# Patient Record
Sex: FEMALE | Race: WHITE | URBAN - NONMETROPOLITAN AREA
[De-identification: names, ages, dates, MRNs, and addresses within clinical notes are randomized per-mention and may not be internally consistent; named-entity substitution may affect disease eponyms.]

---

## 2019-05-13 ENCOUNTER — APPOINTMENT (OUTPATIENT)
Dept: URBAN - NONMETROPOLITAN AREA CLINIC 39 | Age: 24
Setting detail: DERMATOLOGY
End: 2019-05-13

## 2019-05-13 DIAGNOSIS — L73.2 HIDRADENITIS SUPPURATIVA: ICD-10-CM

## 2019-05-13 DIAGNOSIS — L81.0 POSTINFLAMMATORY HYPERPIGMENTATION: ICD-10-CM

## 2019-05-13 PROBLEM — G40.909 EPILEPSY, UNSPECIFIED, NOT INTRACTABLE, WITHOUT STATUS EPILEPTICUS: Status: ACTIVE | Noted: 2019-05-13

## 2019-05-13 PROBLEM — L70.0 ACNE VULGARIS: Status: ACTIVE | Noted: 2019-05-13

## 2019-05-13 PROBLEM — F41.9 ANXIETY DISORDER, UNSPECIFIED: Status: ACTIVE | Noted: 2019-05-13

## 2019-05-13 PROBLEM — F32.9 MAJOR DEPRESSIVE DISORDER, SINGLE EPISODE, UNSPECIFIED: Status: ACTIVE | Noted: 2019-05-13

## 2019-05-13 PROCEDURE — 99202 OFFICE O/P NEW SF 15 MIN: CPT | Mod: 25

## 2019-05-13 PROCEDURE — OTHER REASSURANCE: OTHER

## 2019-05-13 PROCEDURE — OTHER ADDITIONAL NOTES: OTHER

## 2019-05-13 PROCEDURE — 10060 I&D ABSCESS SIMPLE/SINGLE: CPT

## 2019-05-13 PROCEDURE — OTHER COUNSELING: OTHER

## 2019-05-13 PROCEDURE — OTHER MIPS QUALITY: OTHER

## 2019-05-13 PROCEDURE — OTHER INCISION AND DRAINAGE: OTHER

## 2019-05-13 PROCEDURE — OTHER ORDER TESTS: OTHER

## 2019-05-13 ASSESSMENT — LOCATION DETAILED DESCRIPTION DERM
LOCATION DETAILED: LEFT INFERIOR CENTRAL MALAR CHEEK
LOCATION DETAILED: LEFT ANTERIOR PROXIMAL THIGH
LOCATION DETAILED: RIGHT ANTERIOR PROXIMAL THIGH
LOCATION DETAILED: RIGHT INFERIOR CENTRAL MALAR CHEEK

## 2019-05-13 ASSESSMENT — LOCATION ZONE DERM
LOCATION ZONE: FACE
LOCATION ZONE: LEG

## 2019-05-13 ASSESSMENT — LOCATION SIMPLE DESCRIPTION DERM
LOCATION SIMPLE: LEFT THIGH
LOCATION SIMPLE: RIGHT THIGH
LOCATION SIMPLE: RIGHT CHEEK
LOCATION SIMPLE: LEFT CHEEK

## 2019-05-13 NOTE — PROCEDURE: INCISION AND DRAINAGE
Epidermal Sutures: 4-0 Ethilon
Dressing: Band-Aid
Consent was obtained and risks were reviewed including but not limited to delayed wound healing, infection, need for multiple I and D's, and pain.
Include Sutures?: No
Suture Text: The incision was partially closed with
Preparation Text: The area was prepped in the usual clean fashion.
Body Location Override (Optional - Billing Will Still Be Based On Selected Body Map Location If Applicable): left groin
Size Of Lesion In Cm (Optional But May Be Required For Some Insurances): 1.5
Method: 11 blade
Post-Care Instructions: I reviewed with the patient in detail post-care instructions. Patient should keep wound covered and call the office should any redness, pain, swelling or worsening occur.
Epidermal Closure: simple interrupted
Lesion Type: Cyst
Wound Care: Vaseline
Detail Level: Zone

## 2019-05-13 NOTE — PROCEDURE: ADDITIONAL NOTES
Additional Notes: Will wait for c&S results to call in an ATB
Detail Level: Zone
Additional Notes: Use a face wash with BP

## 2019-08-05 ENCOUNTER — RX ONLY (RX ONLY)
Age: 24
End: 2019-08-05

## 2019-08-05 RX ORDER — ADAPALENE 3 MG/G
GEL TOPICAL
Qty: 1 | Refills: 2 | Status: ERX | COMMUNITY
Start: 2019-08-05